# Patient Record
Sex: FEMALE | Race: WHITE | URBAN - METROPOLITAN AREA
[De-identification: names, ages, dates, MRNs, and addresses within clinical notes are randomized per-mention and may not be internally consistent; named-entity substitution may affect disease eponyms.]

---

## 2019-09-17 ENCOUNTER — ANESTHESIA EVENT (OUTPATIENT)
Dept: SURGERY | Facility: CLINIC | Age: 64
End: 2019-09-17
Payer: MEDICARE

## 2019-09-17 NOTE — ANESTHESIA PREPROCEDURE EVALUATION
Anesthesia Pre-Procedure Evaluation    Patient: Alana Brown   MRN: [id number] : 1955          Preoperative Diagnosis: Right shoulder rotator cuff tear    Procedure(s):  ARTHROSCOPY, SHOULDER WITH REPAIR, ROTATOR CUFF,MINI OPEN    History reviewed. No pertinent past medical history.  Past Surgical History:   Procedure Laterality Date     COSMETIC SURGERY      breast augmention     ORTHOPEDIC SURGERY      back surgery, 4th and 5th lumbar       Anesthesia Evaluation     . Pt has had prior anesthetic. Type: General and MAC    No history of anesthetic complications          ROS/MED HX    ENT/Pulmonary:     (+)allergic rhinitis, , . .    Neurologic:  - neg neurologic ROS     Cardiovascular:     (+) ----. : . . . :. . Previous cardiac testing date:results:date: results:ECG reviewed date:19 results:NSR, low voltage QRS date: results:          METS/Exercise Tolerance:  >4 METS   Hematologic:  - neg hematologic  ROS       Musculoskeletal: Comment: Lat. Epicondylitis  CTS  (+)  other musculoskeletal- right rotator cuff tear      GI/Hepatic:  - neg GI/hepatic ROS       Renal/Genitourinary:  - ROS Renal section negative       Endo:  - neg endo ROS       Psychiatric:  - neg psychiatric ROS       Infectious Disease:  - neg infectious disease ROS       Malignancy:      - no malignancy   Other: Comment: HPV  Cervical dysplasia  Leukorrhea                           Physical Exam  Normal systems: cardiovascular, pulmonary and dental    Airway   Mallampati: I  TM distance: >3 FB  Neck ROM: full    Dental     Cardiovascular   Rhythm and rate: regular and normal      Pulmonary    breath sounds clear to auscultation            No results found for: WBC, HGB, HCT, PLT, CRP, SED, NA, POTASSIUM, CHLORIDE, CO2, BUN, CR, GLC, GLENIS, PHOS, MAG, ALBUMIN, PROTTOTAL, ALT, AST, GGT, ALKPHOS, BILITOTAL, BILIDIRECT, LIPASE, AMYLASE, PIPO, PTT, INR, FIBR, TSH, T4, T3, HCG, HCGS, CKTOTAL, CKMB, TROPN    Preop Vitals  BP Readings  from Last 3 Encounters:   07/19/11 118/68    Pulse Readings from Last 3 Encounters:   07/19/11 72      Resp Readings from Last 3 Encounters:   No data found for Resp    SpO2 Readings from Last 3 Encounters:   No data found for SpO2      Temp Readings from Last 1 Encounters:   07/19/11 36.7  C (98  F)    Ht Readings from Last 1 Encounters:   07/19/11 1.524 m (5')      Wt Readings from Last 1 Encounters:   07/19/11 63.8 kg (140 lb 9.6 oz)    Estimated body mass index is 27.46 kg/m  as calculated from the following:    Height as of 7/19/11: 1.524 m (5').    Weight as of 7/19/11: 63.8 kg (140 lb 9.6 oz).       Anesthesia Plan      History & Physical Review  History and physical reviewed and following examination; no interval change.    ASA Status:  2 .    NPO Status:  > 8 hours    Plan for General, ETT and Periph. Nerve Block for postop pain with Propofol and Intravenous induction. Maintenance will be Balanced.      GETA with right interscalene nerve block for post-op pain control.      Postoperative Care  Postoperative pain management:  IV analgesics, Oral pain medications and Peripheral nerve block (Single Shot).      Consents  Anesthetic plan, risks, benefits and alternatives discussed with:  Patient..                 HANY Reyes CRNA

## 2019-09-19 ENCOUNTER — ANESTHESIA (OUTPATIENT)
Dept: SURGERY | Facility: CLINIC | Age: 64
End: 2019-09-19
Payer: MEDICARE

## 2019-09-19 ENCOUNTER — HOSPITAL ENCOUNTER (OUTPATIENT)
Facility: CLINIC | Age: 64
Discharge: HOME | End: 2019-09-19
Attending: ORTHOPAEDIC SURGERY | Admitting: ORTHOPAEDIC SURGERY
Payer: MEDICARE

## 2019-09-19 VITALS
HEIGHT: 64 IN | RESPIRATION RATE: 16 BRPM | DIASTOLIC BLOOD PRESSURE: 99 MMHG | SYSTOLIC BLOOD PRESSURE: 151 MMHG | WEIGHT: 140 LBS | HEART RATE: 79 BPM | BODY MASS INDEX: 23.9 KG/M2 | OXYGEN SATURATION: 96 % | TEMPERATURE: 97.6 F

## 2019-09-19 DIAGNOSIS — M75.101 TEAR OF RIGHT ROTATOR CUFF, UNSPECIFIED TEAR EXTENT, UNSPECIFIED WHETHER TRAUMATIC: Primary | ICD-10-CM

## 2019-09-19 PROCEDURE — 25000125 ZZHC RX 250: Performed by: NURSE ANESTHETIST, CERTIFIED REGISTERED

## 2019-09-19 PROCEDURE — 25800030 ZZH RX IP 258 OP 636: Performed by: NURSE ANESTHETIST, CERTIFIED REGISTERED

## 2019-09-19 PROCEDURE — C1713 ANCHOR/SCREW BN/BN,TIS/BN: HCPCS | Performed by: ORTHOPAEDIC SURGERY

## 2019-09-19 PROCEDURE — 25000566 ZZH SEVOFLURANE, EA 15 MIN: Performed by: ORTHOPAEDIC SURGERY

## 2019-09-19 PROCEDURE — C9290 INJ, BUPIVACAINE LIPOSOME: HCPCS | Performed by: ORTHOPAEDIC SURGERY

## 2019-09-19 PROCEDURE — 25000128 H RX IP 250 OP 636: Performed by: NURSE ANESTHETIST, CERTIFIED REGISTERED

## 2019-09-19 PROCEDURE — 25000128 H RX IP 250 OP 636: Performed by: ORTHOPAEDIC SURGERY

## 2019-09-19 PROCEDURE — 37000009 ZZH ANESTHESIA TECHNICAL FEE, EACH ADDTL 15 MIN: Performed by: ORTHOPAEDIC SURGERY

## 2019-09-19 PROCEDURE — 27110028 ZZH OR GENERAL SUPPLY NON-STERILE: Performed by: ORTHOPAEDIC SURGERY

## 2019-09-19 PROCEDURE — 25000128 H RX IP 250 OP 636: Performed by: PHYSICIAN ASSISTANT

## 2019-09-19 PROCEDURE — 27210794 ZZH OR GENERAL SUPPLY STERILE: Performed by: ORTHOPAEDIC SURGERY

## 2019-09-19 PROCEDURE — 25000132 ZZH RX MED GY IP 250 OP 250 PS 637: Mod: GY | Performed by: NURSE ANESTHETIST, CERTIFIED REGISTERED

## 2019-09-19 PROCEDURE — 71000027 ZZH RECOVERY PHASE 2 EACH 15 MINS: Performed by: ORTHOPAEDIC SURGERY

## 2019-09-19 PROCEDURE — 36000063 ZZH SURGERY LEVEL 4 EA 15 ADDTL MIN: Performed by: ORTHOPAEDIC SURGERY

## 2019-09-19 PROCEDURE — 36000093 ZZH SURGERY LEVEL 4 1ST 30 MIN: Performed by: ORTHOPAEDIC SURGERY

## 2019-09-19 PROCEDURE — 40000306 ZZH STATISTIC PRE PROC ASSESS II: Performed by: ORTHOPAEDIC SURGERY

## 2019-09-19 PROCEDURE — 37000008 ZZH ANESTHESIA TECHNICAL FEE, 1ST 30 MIN: Performed by: ORTHOPAEDIC SURGERY

## 2019-09-19 PROCEDURE — 71000012 ZZH RECOVERY PHASE 1 LEVEL 1 FIRST HR: Performed by: ORTHOPAEDIC SURGERY

## 2019-09-19 DEVICE — IMPLANTABLE DEVICE: Type: IMPLANTABLE DEVICE | Site: SHOULDER | Status: FUNCTIONAL

## 2019-09-19 DEVICE — IMP ANCHOR SUTURE HEALICOIL REGENESORB 4.75MM BLUE 72203705: Type: IMPLANTABLE DEVICE | Site: SHOULDER | Status: FUNCTIONAL

## 2019-09-19 RX ORDER — DEXAMETHASONE SODIUM PHOSPHATE 4 MG/ML
4 INJECTION, SOLUTION INTRA-ARTICULAR; INTRALESIONAL; INTRAMUSCULAR; INTRAVENOUS; SOFT TISSUE EVERY 10 MIN PRN
Status: DISCONTINUED | OUTPATIENT
Start: 2019-09-19 | End: 2019-09-19 | Stop reason: HOSPADM

## 2019-09-19 RX ORDER — ACETAMINOPHEN 325 MG/1
650 TABLET ORAL EVERY 4 HOURS PRN
Qty: 50 TABLET | Refills: 0
Start: 2019-09-19

## 2019-09-19 RX ORDER — HYDROXYZINE HYDROCHLORIDE 25 MG/1
25 TABLET, FILM COATED ORAL EVERY 6 HOURS PRN
Status: DISCONTINUED | OUTPATIENT
Start: 2019-09-19 | End: 2019-09-19 | Stop reason: HOSPADM

## 2019-09-19 RX ORDER — SODIUM CHLORIDE, SODIUM LACTATE, POTASSIUM CHLORIDE, CALCIUM CHLORIDE 600; 310; 30; 20 MG/100ML; MG/100ML; MG/100ML; MG/100ML
INJECTION, SOLUTION INTRAVENOUS CONTINUOUS
Status: DISCONTINUED | OUTPATIENT
Start: 2019-09-19 | End: 2019-09-19 | Stop reason: HOSPADM

## 2019-09-19 RX ORDER — ONDANSETRON 2 MG/ML
4 INJECTION INTRAMUSCULAR; INTRAVENOUS EVERY 30 MIN PRN
Status: DISCONTINUED | OUTPATIENT
Start: 2019-09-19 | End: 2019-09-19 | Stop reason: HOSPADM

## 2019-09-19 RX ORDER — ACETAMINOPHEN 325 MG/1
975 TABLET ORAL ONCE
Status: COMPLETED | OUTPATIENT
Start: 2019-09-19 | End: 2019-09-19

## 2019-09-19 RX ORDER — ALBUTEROL SULFATE 0.83 MG/ML
2.5 SOLUTION RESPIRATORY (INHALATION) EVERY 4 HOURS PRN
Status: DISCONTINUED | OUTPATIENT
Start: 2019-09-19 | End: 2019-09-19 | Stop reason: HOSPADM

## 2019-09-19 RX ORDER — MEPERIDINE HYDROCHLORIDE 25 MG/ML
12.5 INJECTION INTRAMUSCULAR; INTRAVENOUS; SUBCUTANEOUS
Status: DISCONTINUED | OUTPATIENT
Start: 2019-09-19 | End: 2019-09-19 | Stop reason: HOSPADM

## 2019-09-19 RX ORDER — ONDANSETRON 4 MG/1
4 TABLET, ORALLY DISINTEGRATING ORAL EVERY 30 MIN PRN
Status: DISCONTINUED | OUTPATIENT
Start: 2019-09-19 | End: 2019-09-19 | Stop reason: HOSPADM

## 2019-09-19 RX ORDER — OXYCODONE HYDROCHLORIDE 5 MG/1
5 TABLET ORAL EVERY 6 HOURS PRN
Qty: 50 TABLET | Refills: 0 | Status: SHIPPED | OUTPATIENT
Start: 2019-09-19 | End: 2019-09-22

## 2019-09-19 RX ORDER — GLYCOPYRROLATE 0.2 MG/ML
INJECTION, SOLUTION INTRAMUSCULAR; INTRAVENOUS PRN
Status: DISCONTINUED | OUTPATIENT
Start: 2019-09-19 | End: 2019-09-19

## 2019-09-19 RX ORDER — CEFAZOLIN SODIUM 1 G/50ML
1 INJECTION, SOLUTION INTRAVENOUS SEE ADMIN INSTRUCTIONS
Status: DISCONTINUED | OUTPATIENT
Start: 2019-09-19 | End: 2019-09-19 | Stop reason: HOSPADM

## 2019-09-19 RX ORDER — GABAPENTIN 300 MG/1
300 CAPSULE ORAL ONCE
Status: COMPLETED | OUTPATIENT
Start: 2019-09-19 | End: 2019-09-19

## 2019-09-19 RX ORDER — CEFAZOLIN SODIUM 2 G/100ML
2 INJECTION, SOLUTION INTRAVENOUS
Status: COMPLETED | OUTPATIENT
Start: 2019-09-19 | End: 2019-09-19

## 2019-09-19 RX ORDER — ONDANSETRON 2 MG/ML
INJECTION INTRAMUSCULAR; INTRAVENOUS PRN
Status: DISCONTINUED | OUTPATIENT
Start: 2019-09-19 | End: 2019-09-19

## 2019-09-19 RX ORDER — FENTANYL CITRATE 50 UG/ML
25-50 INJECTION, SOLUTION INTRAMUSCULAR; INTRAVENOUS
Status: DISCONTINUED | OUTPATIENT
Start: 2019-09-19 | End: 2019-09-19 | Stop reason: HOSPADM

## 2019-09-19 RX ORDER — NEOSTIGMINE METHYLSULFATE 1 MG/ML
VIAL (ML) INJECTION PRN
Status: DISCONTINUED | OUTPATIENT
Start: 2019-09-19 | End: 2019-09-19

## 2019-09-19 RX ORDER — BUPIVACAINE HYDROCHLORIDE 5 MG/ML
INJECTION, SOLUTION EPIDURAL; INTRACAUDAL PRN
Status: DISCONTINUED | OUTPATIENT
Start: 2019-09-19 | End: 2019-09-19

## 2019-09-19 RX ORDER — PROPOFOL 10 MG/ML
INJECTION, EMULSION INTRAVENOUS PRN
Status: DISCONTINUED | OUTPATIENT
Start: 2019-09-19 | End: 2019-09-19

## 2019-09-19 RX ORDER — HYDROMORPHONE HYDROCHLORIDE 1 MG/ML
.3-.5 INJECTION, SOLUTION INTRAMUSCULAR; INTRAVENOUS; SUBCUTANEOUS EVERY 10 MIN PRN
Status: DISCONTINUED | OUTPATIENT
Start: 2019-09-19 | End: 2019-09-19 | Stop reason: HOSPADM

## 2019-09-19 RX ORDER — HYDROXYZINE HYDROCHLORIDE 50 MG/1
50 TABLET, FILM COATED ORAL EVERY 6 HOURS PRN
Status: DISCONTINUED | OUTPATIENT
Start: 2019-09-19 | End: 2019-09-19 | Stop reason: HOSPADM

## 2019-09-19 RX ORDER — NALOXONE HYDROCHLORIDE 0.4 MG/ML
.1-.4 INJECTION, SOLUTION INTRAMUSCULAR; INTRAVENOUS; SUBCUTANEOUS
Status: DISCONTINUED | OUTPATIENT
Start: 2019-09-19 | End: 2019-09-19 | Stop reason: HOSPADM

## 2019-09-19 RX ORDER — LIDOCAINE HYDROCHLORIDE 10 MG/ML
INJECTION, SOLUTION EPIDURAL; INFILTRATION; INTRACAUDAL; PERINEURAL PRN
Status: DISCONTINUED | OUTPATIENT
Start: 2019-09-19 | End: 2019-09-19

## 2019-09-19 RX ORDER — LIDOCAINE 40 MG/G
CREAM TOPICAL
Status: DISCONTINUED | OUTPATIENT
Start: 2019-09-19 | End: 2019-09-19 | Stop reason: HOSPADM

## 2019-09-19 RX ORDER — HYDROXYZINE PAMOATE 25 MG/1
25-50 CAPSULE ORAL 4 TIMES DAILY PRN
Qty: 50 CAPSULE | Refills: 0 | Status: ON HOLD | OUTPATIENT
Start: 2019-09-19 | End: 2020-08-31

## 2019-09-19 RX ORDER — DEXAMETHASONE SODIUM PHOSPHATE 4 MG/ML
INJECTION, SOLUTION INTRA-ARTICULAR; INTRALESIONAL; INTRAMUSCULAR; INTRAVENOUS; SOFT TISSUE PRN
Status: DISCONTINUED | OUTPATIENT
Start: 2019-09-19 | End: 2019-09-19

## 2019-09-19 RX ORDER — EPHEDRINE SULFATE 50 MG/ML
INJECTION, SOLUTION INTRAMUSCULAR; INTRAVENOUS; SUBCUTANEOUS PRN
Status: DISCONTINUED | OUTPATIENT
Start: 2019-09-19 | End: 2019-09-19

## 2019-09-19 RX ORDER — FENTANYL CITRATE 50 UG/ML
INJECTION, SOLUTION INTRAMUSCULAR; INTRAVENOUS PRN
Status: DISCONTINUED | OUTPATIENT
Start: 2019-09-19 | End: 2019-09-19

## 2019-09-19 RX ORDER — LIDOCAINE HYDROCHLORIDE 10 MG/ML
INJECTION, SOLUTION INFILTRATION; PERINEURAL PRN
Status: DISCONTINUED | OUTPATIENT
Start: 2019-09-19 | End: 2019-09-19

## 2019-09-19 RX ORDER — METOPROLOL TARTRATE 1 MG/ML
1-2 INJECTION, SOLUTION INTRAVENOUS EVERY 5 MIN PRN
Status: DISCONTINUED | OUTPATIENT
Start: 2019-09-19 | End: 2019-09-19 | Stop reason: HOSPADM

## 2019-09-19 RX ADMIN — Medication 10 MG: at 14:15

## 2019-09-19 RX ADMIN — GLYCOPYRROLATE 0.6 MG: 0.2 INJECTION, SOLUTION INTRAMUSCULAR; INTRAVENOUS at 15:23

## 2019-09-19 RX ADMIN — MIDAZOLAM 2 MG: 1 INJECTION INTRAMUSCULAR; INTRAVENOUS at 13:07

## 2019-09-19 RX ADMIN — FENTANYL CITRATE 50 MCG: 50 INJECTION, SOLUTION INTRAMUSCULAR; INTRAVENOUS at 15:30

## 2019-09-19 RX ADMIN — DEXAMETHASONE SODIUM PHOSPHATE 4 MG: 4 INJECTION, SOLUTION INTRA-ARTICULAR; INTRALESIONAL; INTRAMUSCULAR; INTRAVENOUS; SOFT TISSUE at 14:20

## 2019-09-19 RX ADMIN — SODIUM CHLORIDE, POTASSIUM CHLORIDE, SODIUM LACTATE AND CALCIUM CHLORIDE: 600; 310; 30; 20 INJECTION, SOLUTION INTRAVENOUS at 14:43

## 2019-09-19 RX ADMIN — BUPIVACAINE 10 ML: 13.3 INJECTION, SUSPENSION, LIPOSOMAL INFILTRATION at 13:22

## 2019-09-19 RX ADMIN — Medication 5 MG: at 14:39

## 2019-09-19 RX ADMIN — ROCURONIUM BROMIDE 40 MG: 10 INJECTION INTRAVENOUS at 14:05

## 2019-09-19 RX ADMIN — ONDANSETRON 4 MG: 2 INJECTION INTRAMUSCULAR; INTRAVENOUS at 15:22

## 2019-09-19 RX ADMIN — SODIUM CHLORIDE, POTASSIUM CHLORIDE, SODIUM LACTATE AND CALCIUM CHLORIDE: 600; 310; 30; 20 INJECTION, SOLUTION INTRAVENOUS at 12:34

## 2019-09-19 RX ADMIN — CEFAZOLIN SODIUM 2 G: 2 INJECTION, SOLUTION INTRAVENOUS at 13:58

## 2019-09-19 RX ADMIN — GLYCOPYRROLATE 0.2 MG: 0.2 INJECTION, SOLUTION INTRAMUSCULAR; INTRAVENOUS at 14:05

## 2019-09-19 RX ADMIN — Medication 3 MG: at 15:23

## 2019-09-19 RX ADMIN — ACETAMINOPHEN 975 MG: 325 TABLET, FILM COATED ORAL at 12:48

## 2019-09-19 RX ADMIN — BUPIVACAINE HYDROCHLORIDE 10 ML: 5 INJECTION, SOLUTION EPIDURAL; INTRACAUDAL; PERINEURAL at 13:22

## 2019-09-19 RX ADMIN — LIDOCAINE HYDROCHLORIDE 50 MG: 10 INJECTION, SOLUTION INFILTRATION; PERINEURAL at 14:05

## 2019-09-19 RX ADMIN — PROPOFOL 150 MG: 10 INJECTION, EMULSION INTRAVENOUS at 14:05

## 2019-09-19 RX ADMIN — MIDAZOLAM 2 MG: 1 INJECTION INTRAMUSCULAR; INTRAVENOUS at 13:13

## 2019-09-19 RX ADMIN — FENTANYL CITRATE 200 MCG: 50 INJECTION, SOLUTION INTRAMUSCULAR; INTRAVENOUS at 14:05

## 2019-09-19 RX ADMIN — LIDOCAINE HYDROCHLORIDE 3 ML: 10 INJECTION, SOLUTION EPIDURAL; INFILTRATION; INTRACAUDAL; PERINEURAL at 13:19

## 2019-09-19 RX ADMIN — GABAPENTIN 300 MG: 300 CAPSULE ORAL at 12:49

## 2019-09-19 ASSESSMENT — MIFFLIN-ST. JEOR: SCORE: 1165.04

## 2019-09-19 NOTE — DISCHARGE INSTRUCTIONS
Same Day Surgery Discharge Instructions  Special Precautions After Surgery - Adult    1. It is not unusual to feel lightheaded or faint, up to 24 hours after surgery or while taking pain medication.  If you have these symptoms; sit for a few minutes before standing and have someone assist you when getting up.  2. You should rest and relax for the next 24 hours and must have someone stay with you for at least 24 hours after your discharge.  3. DO NOT DRIVE any vehicle or operate mechanical equipment for 24 hours following the end of your surgery.  DO NOT DRIVE while taking narcotic pain medications that have been prescribed by your physician.  If you had a limb operated on, you must be able to use it fully to drive.  4. DO NOT drink alcoholic beverages for 24 hours following surgery or while taking prescription pain medication.  5. Drink clear liquids (apple juice, ginger ale, broth, 7-Up, etc.).  Progress to your regular diet as you feel able.  6. Any questions call your physician and do not make important decisions for 24 hours.    ACTIVITY  ? Restrictions: Keep sling on at all times except when bathing and dressing. Use ice for the next several days.     INCISIONAL CARE  ? May remove dressing in 48 hours.  ? Keep incision dry for 48 hours.  ? Keep extremity elevated above the level of the heart if possible.  Check color and feeling of right fingers.  ? Apply ice 1/2 hour on and 1/2 hour off for first 72 hours.  ? Be alert for signs of infection:  redness, swelling, heat, drainage of pus, and/or elevated temperature.  Contact your doctor if these occur.        Call for an appointment to return to the clinic  2 weeks as scheduled.    Medications:  ? Hydrocodone (Norco, Vicodin):  Next dose: start tonight and stay on schedule.  ? Hydroxyzine as needed for pain,itching,nausea.  Next dose: ***  ? Tylenol as needed for pain.  ? Follow the instructions on the bottle.     Additional discharge  instructions: MD,Block,Expiral intsructions.  __________________________________________________________________________________________________________________________________  IMPORTANT NUMBERS:    [facility] Main Number:  [phone], [phone]  Pharmacy:  [phone]  Same Day Surgery:  [phone], Monday - Friday until 8:30 p.m.  Urgent Care:  [phone]  Emergency Room:  [phone]      [facility]:  [phone]                                                                             [facility]:  [phone] option [address] [place] [address] Orthopedics:  [phone]     [facility] Clinic:  [phone]   Surgery Specialty Clinic:  [phone]   Home Medical Equipment: [phone]  [facility]:  [phone]

## 2019-09-19 NOTE — ANESTHESIA CARE TRANSFER NOTE
Patient: Alana Brown    Procedure(s):  ARTHROSCOPY, SHOULDER WITH REPAIR, ROTATOR CUFF,MINI OPEN    Diagnosis: Right shoulder rotator cuff tear  Diagnosis Additional Information: No value filed.    Anesthesia Type:   General, ETT, Periph. Nerve Block for postop pain     Note:  Airway :Nasal Cannula  Patient transferred to:PACU  Handoff Report: Identifed the Patient, Identified the Reponsible Provider, Reviewed the pertinent medical history, Discussed the surgical course, Reviewed Intra-OP anesthesia mangement and issues during anesthesia, Set expectations for post-procedure period and Allowed opportunity for questions and acknowledgement of understanding      Vitals: (Last set prior to Anesthesia Care Transfer)    CRNA VITALS  9/19/2019 1515 - 9/19/2019 1553      9/19/2019             Resp Rate (observed):  4  (Abnormal)                 Electronically Signed By: HANY Reyes CRNA  September 19, 2019  3:53 PM

## 2019-09-19 NOTE — ANESTHESIA PROCEDURE NOTES
Peripheral nerve/Neuraxial procedure note : interscalene  Pre-Procedure  Performed by  Henrry Back, in the presence of a teaching physician  Clinton Agudelo APRN CRNA  Location: pre-op      Pre-Anesthestic Checklist: patient identified, IV checked, site marked, risks and benefits discussed, informed consent, monitors and equipment checked, pre-op evaluation, at physician/surgeon's request and post-op pain management    Timeout  Correct Patient: Yes   Correct Procedure: Yes   Correct Site: Yes   Correct Laterality: Yes   Correct Position: Yes   Site Marked: Yes   .   Procedure Documentation    .    Procedure:  right  Interscalene.  Local skin infiltrated with mL of 1% lidocaine.     Ultrasound used to identify targeted nerve, plexus, or vascular marker and placed a needle adjacent to it., Ultrasound was used to visualize the spread of the anesthetic in close proximity to the above stated nerve. A permanent image is entered into the patient's record.  Patient Prep;mask, sterile gloves, chlorhexidine gluconate and isopropyl alcohol, patient draped.  Nerve Stim: Initial Level 1 mA.  Lowest motor response 0.4 mA..  Needle: insulated, short bevel Needle Gauge: 20.    Needle Length (Inches) 2  Insertion Method: Single Shot.       Assessment/Narrative  Paresthesias: No.  Injection made incrementally with aspirations every 5 mL..  The placement was negative for: blood aspirated, painful injection and site bleeding.  Bolus given via needle. No blood aspirated via catheter.   Secured via.   Complications: none. Test dose of mL at. Test dose negative for signs of intravascular, subdural or intrathecal injection. Comments:  10mL Exparel with 10mL 0.5% PF marcaine admixture  UDV via ultrasound.  Performed by Henrry Back SRNA supervised by Clinton Agudelo CRNA

## 2019-09-19 NOTE — PROCEDURES
9/19/2019     PREOPERATIVE DIAGNOSIS:    1. Right shoulder rotator cuff tear      POSTOPERATIVE DIAGNOSIS:    1. Right shoulder rotator cuff tear      PROCEDURE:    1. Right shoulder arthroscopy with mini open rotator cuff repair    SURGEON: Morgan Velez MD      ASSISTANT: Jasmine Lopez PA-c.  The presence of a PA was necessary for positioning retraction, and safe progression of the case      ANESTHESIA:  General with interscalene block      BLOOD LOSS: 50cc      COMPLICATIONS: None apparent      DISPOSITION: Stable to PACU      IMPLANTS: [employer] 4.75mm Regenesorb Healicoil x 2 and 5.5mm PEEK MultiFix x 2      INDICATIONS: Alana is a 63 year old female with a history of right shoulder pain due to a large cuff tear.  Worked up revealed a large full thickness cuff tear.  She failed non op management and after discussing treatment options, she elected to proceed with a right shoulder rotator cuff repair to minimize pain and optimize function, understanding the risks of stiffness, ongoing pain, retear, infection, damage to vessels or nerves, and risks inherent to anesthesia.      PROCEDURE: Alana was met in the preoperative holding area where the right shoulder was marked.  She had an interscalene block placed by anesthesia which she tolerated well.  She was then transferred to the operating theater.  After smooth induction of general anesthesia, she was positioned in a beach chair position with all bony prominences padded and head in a neutral position.  A timeout was performed verifying the correct patient, surgery, and location.  She received preoperative antibiotics.  The right shoulder was then prepped and draped in a standard sterile fashion.      We then made a standard posterior lateral portal spot soft spot followed by an anterior portal through the rotator interval under spinal needle visualization. Diagnostic arthroscopy was carried out which showed moderate synovitis throughout the glenohumeral  joint.  The upper rolled border of the subscapularis was intact. There was a large 3cm crescent shaped tear of the supraspinatus and infraspinatus.  The biceps tendon was retracted out of the joint.  There was mild degenerative fraying of the anterior superior labrum.  There was no significant glenohumeral arthritis.  There were no loose bodies. Following the diagnostic arthroscopy, we used a combination of a shaver and ablator to debride the degenerative labral tissue back to a smooth, stable, balanced rim.       We then turned our attention to the subacromial space. We entered this posterolaterally. We then made a lateral portal under spinal needle visualization. A soft tissue subacromial bursectomy was carried out, taking care to leave the CA ligament intact.  From the bursal side, again there was a large crescent shaped tear of the rotator cuff.  A traction suture was placed in the tear.  We then proceeded with the mini open rotator cuff repair. We extended the lateral portal up to the acromion. Dissection was sharply down through skin and subcutaneous tissue and the deltoid muscle split in line with its fibers. We then placed a self-retaining retractor and exposed the rotator cuff tear. The tuberosity with debrided back to a bed of healthy bleeding bone with a rongeur.  We then placed two medial row 4.75mm Regenesorb Healicoil anchors, one anteriorly and one posteriorly.  The tails of the tape and suture were passed through the cuff then tied, securing the medial row.  One tape tail from the anteromedial anchor and one tape tail from the posteromedial anchor were brought to an anterolateral 5.5mm PEEK MultiFix.  The remaining tails were brought to posterolateral 4.5mm PEEK Footprint.  The anchors were implanted, sutures, tensioned, and locking mechanism in anchor deployed.  The shoulder was brought through range of motion and the rotator cuff noted to be moving concentrically with humeral head. All wounds were  thoroughly irrigated.  Deltoid fascia was closed with 0 Vicryl, subcutaneous layer with 3-0 Monocryl, and skin with Monocryl. Sterile compressive dressing was applied followed by a sling, she was awoken anesthesia and transferred to PACU in stable condition.      POSTOPERATIVE PLAN:    1. Charge home today when meets same day discharge criteria   2. Keep sling on at all times. May remove for bathing and pendulums.   3. Return to clinic in 2 weeks for wound check. We'll start physical therapy at 6 weeks postoperatively.      JANNETH MAGDALENO MD

## 2019-09-19 NOTE — ANESTHESIA POSTPROCEDURE EVALUATION
Patient: Alana Brown    Procedure(s):  ARTHROSCOPY, SHOULDER WITH REPAIR, ROTATOR CUFF,MINI OPEN    Diagnosis:Right shoulder rotator cuff tear  Diagnosis Additional Information: No value filed.    Anesthesia Type:  General, ETT, Periph. Nerve Block for postop pain    Note:  Anesthesia Post Evaluation    Patient location during evaluation: Phase 2 and Bedside  Patient participation: Able to participate in evaluation but full recovery from regional anesthesia has not yet ocurrred but is anticipated to occur within 48 hours  Level of consciousness: awake and alert  Pain management: adequate  Airway patency: patent  Cardiovascular status: acceptable  Respiratory status: acceptable  Hydration status: acceptable  PONV: none     Anesthetic complications: None          Last vitals:  Vitals:    09/19/19 1630 09/19/19 1637 09/19/19 1645   BP: (!) 140/88  (!) 143/93   Pulse: 79     Resp: 9  12   Temp:  35.9  C (96.7  F)    SpO2: 96%  94%         Electronically Signed By: Clinton Agudelo CRNA, APRN CRNA  September 19, 2019  4:57 PM

## 2020-06-04 ENCOUNTER — DOCUMENTATION ONLY (OUTPATIENT)
Dept: CARE COORDINATION | Facility: CLINIC | Age: 65
End: 2020-06-04

## 2020-06-11 NOTE — TELEPHONE ENCOUNTER
DIAGNOSIS: new varicose vein consult//text invite   DATE RECEIVED: 6.22.20   NOTES STATUS DETAILS   OFFICE NOTE from referring provider N/A    OFFICE NOTE from other specialist N/A    OPERATIVE REPORT N/A    MEDICATION LIST Internal    PERTINENT LABS N/A    CTA (CT ANGIOGRAPHY) N/A    CT N/A    MRI N/A    ULTRASOUND N/A

## 2020-06-15 ENCOUNTER — TELEPHONE (OUTPATIENT)
Dept: VASCULAR SURGERY | Facility: CLINIC | Age: 65
End: 2020-06-15

## 2020-06-15 DIAGNOSIS — I83.891 SYMPTOMATIC VARICOSE VEINS OF RIGHT LOWER EXTREMITY: Primary | ICD-10-CM

## 2020-06-15 NOTE — TELEPHONE ENCOUNTER
I called and left a voicemail including my call back number.  I called to find out more about her possible varicose vein leg issues and get her scheduled for possible imaging.  SIMON Santoyo RN, BSN  Interventional Radiology Nurse Coordinator   Phone:  [phone]  Pt returned my call.  She splits her time between [place] and [place].  She is leaving soon for [place].  She has right leg only varicose vein issues.  She feels aching and tingling and notes discoloration in her right ankle.  Pt saw a physician while in [place] over a year ago and was prescribed knee high compression stockings.  She wears on and off since then.  She has had no vein surgeries, and no hx of DVT or superficial vein clot.  PLan is to move her consult and get RLE venous comp ultrasound prior.  SIMON Santoyo RN, BSN  Interventional Radiology Nurse Coordinator   Phone:  [phone]

## 2020-06-22 ENCOUNTER — PRE VISIT (OUTPATIENT)
Dept: VASCULAR SURGERY | Facility: CLINIC | Age: 65
End: 2020-06-22

## 2020-07-13 ENCOUNTER — OFFICE VISIT (OUTPATIENT)
Dept: VASCULAR SURGERY | Facility: CLINIC | Age: 65
End: 2020-07-13
Payer: MEDICARE

## 2020-07-13 ENCOUNTER — ANCILLARY PROCEDURE (OUTPATIENT)
Dept: ULTRASOUND IMAGING | Facility: CLINIC | Age: 65
End: 2020-07-13
Attending: RADIOLOGY
Payer: MEDICARE

## 2020-07-13 VITALS — SYSTOLIC BLOOD PRESSURE: 161 MMHG | DIASTOLIC BLOOD PRESSURE: 91 MMHG | HEART RATE: 55 BPM

## 2020-07-13 DIAGNOSIS — I83.891 SYMPTOMATIC VARICOSE VEINS OF RIGHT LOWER EXTREMITY: ICD-10-CM

## 2020-07-13 DIAGNOSIS — I83.891 SYMPTOMATIC VARICOSE VEINS OF RIGHT LOWER EXTREMITY: Primary | ICD-10-CM

## 2020-07-13 ASSESSMENT — PAIN SCALES - GENERAL: PAINLEVEL: MODERATE PAIN (5)

## 2020-07-13 NOTE — LETTER
7/13/2020       RE: Alana Brown  [address]  [facility] [address]     Dear Colleague,    Thank you for referring your patient, Alana Brown, to the [facility] at [facility]. Please see a copy of my visit note below.          Interventional Radiology Clinic Visit    Date of this visit: 7/13/2020    Alana Brown  is referred for treatment recommendations. The patient requires evaluation for diagnosis of varicose veins    [facility]        History Of Present Illness:    Alana Brown is a 64 year old female who presents with significant RLE venous insufficiency. She was initially seen by a physician in [place] for this in April 2019. He prescribed medical grade compression stockings at that time, and she has been wearing them regularly since. She moved however, and did not get follow up care with him. She states that for years she has had issues including leg swelling, pain, and bulging veins on the right. This is disruptive of her lifestyle. It requires elevation for alleviation . It also subsides in the morning and worsens as the goes on. She feels calf pressure like pain that can be 5-7/10 in severity. She does not routinely take analgesics for this pain. She worked as a  for many years, which involved standing for long hours. She states that the bulging mid calf veins are the most tender and problematic for her.    Review of Systems:    Negative except as noted    Past Medical/Surgical History:    No past medical history on file.  Past Surgical History:   Procedure Laterality Date     ARTHROSCOPY SHOULDER, OPEN ROTATOR CUFF REPAIR, COMBINED Right 9/19/2019    Procedure: ARTHROSCOPY, SHOULDER WITH REPAIR, ROTATOR CUFF,MINI OPEN;  Surgeon: Morgan Velez MD;  Location: [facility] OR     COSMETIC SURGERY  2004    breast augmention     ORTHOPEDIC SURGERY  2007    back surgery, 4th and 5th lumbar       Current  Medications:    Current Outpatient Medications   Medication Sig Dispense Refill     progesterone (PROMETRIUM) 100 MG capsule Take 100 mg by mouth daily       acetaminophen (TYLENOL) 325 MG tablet Take 2 tablets (650 mg) by mouth every 4 hours as needed for mild pain (Patient not taking: Reported on 7/13/2020) 50 tablet 0     hydrOXYzine (VISTARIL) 25 MG capsule Take 1-2 capsules (25-50 mg) by mouth 4 times daily as needed for itching (Take wtih pain meds.  Helps with nausea, muscle spasms, and pain) (Patient not taking: Reported on 7/13/2020) 50 capsule 0       Allergies:    Nka [no known allergies]    Family History:    No family history on file.    Social History:      Social History     Socioeconomic History     Marital status:      Spouse name: None     Number of children: None     Years of education: None     Highest education level: None   Occupational History     None   Social Needs     Financial resource strain: None     Food insecurity     Worry: None     Inability: None     Transportation needs     Medical: None     Non-medical: None   Tobacco Use     Smoking status: Never Smoker     Smokeless tobacco: Never Used   Substance and Sexual Activity     Alcohol use: Yes     Comment: glass of wine at night     Drug use: No     Sexual activity: Yes   Lifestyle     Physical activity     Days per week: None     Minutes per session: None     Stress: None   Relationships     Social connections     Talks on phone: None     Gets together: None     Attends Scientologist service: None     Active member of club or organization: None     Attends meetings of clubs or organizations: None     Relationship status: None     Intimate partner violence     Fear of current or ex partner: None     Emotionally abused: None     Physically abused: None     Forced sexual activity: None   Other Topics Concern     Parent/sibling w/ CABG, MI or angioplasty before 65F 55M? No   Social History Narrative     None       Physical Exam:    BP  (!) 161/91 (BP Location: Right arm)   Pulse 55      GENERAL APPEARANCE: healthy, alert and no distress  PSYCHIATRIC: mentation appears normal and affect normal.  EYES: No jaundice.  SKIN: No jaundice.   Extremities: b/l DP/PT pulses +2, bulging veins along the medial right thigh, mid calf, and ankle. No wounds. Scattered telangiectasias in this distribution as well as some subtle hyperpigmentation. Scaling and mild lipodermatosclerosis at the ankle only    Laboratory Studies:    No results found for: HGB  No results found for: PLT  No results found for: WBC    No results found for: INR    No results found for: PROTTOTAL   No results found for: ALBUMIN  No results found for: BILITOTAL  No results found for: BILICONJ   No results found for: ALKPHOS  No results found for: AST  No results found for: ALT    No results found for: CR  No results found for: BUN    No results found for: FETO    Imaging:     I reviewed the venous competency US from today, which shows: right side: dilated incompetent varicose veins at the thigh and ankle, dilated competent veins at the mid calf, and diffuse GSV incompetence, as read by me      ASSESSMENT/PLAN:    RLE truncal varicosities, symptomatic and life style disruptive for the patient that has not responded to trial of compression stockings for more than three months. We will schedule her for R GSV EVLA with sclerotherapy of the ankle varicosities, microphlebectomy of the thigh veins, and possible treatment of the calf veins, depending on insurance approval.    A total of 45 minutes was spent in care for the patient, of which >50% was spent in counseling and co-ordination of care.    It was a pleasure seeing the patient.     Sagar Rojas MD    I, Dr Yeyo Bradley, was present with the fellow during the history and exam. I discussed the case with the fellow and agree with the findings as documented in the assessment and plan.      Yeyo Bradley MD    Vascular and  Interventional Radiolgy  [facility]        CC  Patient Care Team:  [facility] PCP - General  SELF, REFERRED    Again, thank you for allowing me to participate in the care of your patient.      Sincerely,    Yeyo Bradley MD

## 2020-07-13 NOTE — PROGRESS NOTES
Interventional Radiology Clinic Visit    Date of this visit: 7/13/2020    Alana Brown  is referred for treatment recommendations. The patient requires evaluation for diagnosis of varicose veins    Primary Physician: [facility]        History Of Present Illness:    Alana Brown is a 64 year old female who presents with significant RLE venous insufficiency. She was initially seen by a physician in [place] for this in April 2019. He prescribed medical grade compression stockings at that time, and she has been wearing them regularly since. She moved however, and did not get follow up care with him. She states that for years she has had issues including leg swelling, pain, and bulging veins on the right. This is disruptive of her lifestyle. It requires elevation for alleviation . It also subsides in the morning and worsens as the goes on. She feels calf pressure like pain that can be 5-7/10 in severity. She does not routinely take analgesics for this pain. She worked as a  for many years, which involved standing for long hours. She states that the bulging mid calf veins are the most tender and problematic for her.    Review of Systems:    Negative except as noted    Past Medical/Surgical History:    No past medical history on file.  Past Surgical History:   Procedure Laterality Date     ARTHROSCOPY SHOULDER, OPEN ROTATOR CUFF REPAIR, COMBINED Right 9/19/2019    Procedure: ARTHROSCOPY, SHOULDER WITH REPAIR, ROTATOR CUFF,MINI OPEN;  Surgeon: Morgan Velez MD;  Location: [place] OR     COSMETIC SURGERY  2004    breast augmention     ORTHOPEDIC SURGERY  2007    back surgery, 4th and 5th lumbar       Current Medications:    Current Outpatient Medications   Medication Sig Dispense Refill     progesterone (PROMETRIUM) 100 MG capsule Take 100 mg by mouth daily       acetaminophen (TYLENOL) 325 MG tablet Take 2 tablets (650 mg) by mouth every 4 hours as needed for mild pain (Patient not  taking: Reported on 7/13/2020) 50 tablet 0     hydrOXYzine (VISTARIL) 25 MG capsule Take 1-2 capsules (25-50 mg) by mouth 4 times daily as needed for itching (Take wtih pain meds.  Helps with nausea, muscle spasms, and pain) (Patient not taking: Reported on 7/13/2020) 50 capsule 0       Allergies:    Nka [no known allergies]    Family History:    No family history on file.    Social History:      Social History     Socioeconomic History     Marital status:      Spouse name: None     Number of children: None     Years of education: None     Highest education level: None   Occupational History     None   Social Needs     Financial resource strain: None     Food insecurity     Worry: None     Inability: None     Transportation needs     Medical: None     Non-medical: None   Tobacco Use     Smoking status: Never Smoker     Smokeless tobacco: Never Used   Substance and Sexual Activity     Alcohol use: Yes     Comment: glass of wine at night     Drug use: No     Sexual activity: Yes   Lifestyle     Physical activity     Days per week: None     Minutes per session: None     Stress: None   Relationships     Social connections     Talks on phone: None     Gets together: None     Attends [employer] service: None     Active member of club or organization: None     Attends meetings of clubs or organizations: None     Relationship status: None     Intimate partner violence     Fear of current or ex partner: None     Emotionally abused: None     Physically abused: None     Forced sexual activity: None   Other Topics Concern     Parent/sibling w/ CABG, MI or angioplasty before 65F 55M? No   Social History Narrative     None       Physical Exam:    BP (!) 161/91 (BP Location: Right arm)   Pulse 55      GENERAL APPEARANCE: healthy, alert and no distress  PSYCHIATRIC: mentation appears normal and affect normal.  EYES: No jaundice.  SKIN: No jaundice.   Extremities: b/l DP/PT pulses +2, bulging veins along the medial right  thigh, mid calf, and ankle. No wounds. Scattered telangiectasias in this distribution as well as some subtle hyperpigmentation. Scaling and mild lipodermatosclerosis at the ankle only    Laboratory Studies:    No results found for: HGB  No results found for: PLT  No results found for: WBC    No results found for: INR    No results found for: PROTTOTAL   No results found for: ALBUMIN  No results found for: BILITOTAL  No results found for: BILICONJ   No results found for: ALKPHOS  No results found for: AST  No results found for: ALT    No results found for: CR  No results found for: BUN    No results found for: FETO    Imaging:     I reviewed the venous competency US from today, which shows: right side: dilated incompetent varicose veins at the thigh and ankle, dilated competent veins at the mid calf, and diffuse GSV incompetence, as read by me      ASSESSMENT/PLAN:    RLE truncal varicosities, symptomatic and life style disruptive for the patient that has not responded to trial of compression stockings for more than three months. We will schedule her for R GSV EVLA with sclerotherapy of the ankle varicosities, microphlebectomy of the thigh veins, and possible treatment of the calf veins, depending on insurance approval.    A total of 45 minutes was spent in care for the patient, of which >50% was spent in counseling and co-ordination of care.    It was a pleasure seeing the patient.     Sagar Rojas MD    I, Dr Yeyo Bradley, was present with the fellow during the history and exam. I discussed the case with the fellow and agree with the findings as documented in the assessment and plan.      Yeyo Bradley MD    Vascular and Interventional Radiolgy  [facility]        CC  Patient Care Team:  [facility] PCP - General  SELF, REFERRED

## 2020-07-13 NOTE — LETTER
Date:July 30, 2020      Patient was self referred, no letter generated. Do not send.        [facility] Health Information

## 2020-07-13 NOTE — NURSING NOTE
Chief Complaint   Patient presents with     Consult     Alana is here today for a consult for a varicose vein on her right leg.     GHULAM Real

## 2020-07-23 ENCOUNTER — TELEPHONE (OUTPATIENT)
Dept: VASCULAR SURGERY | Facility: CLINIC | Age: 65
End: 2020-07-23

## 2020-07-23 DIAGNOSIS — I83.891 SYMPTOMATIC VARICOSE VEINS OF RIGHT LOWER EXTREMITY: Primary | ICD-10-CM

## 2020-07-23 NOTE — TELEPHONE ENCOUNTER
I called and left a voicemail.  I called to get dates for Alana's right leg varicose vein treatment.  SIMON Santoyo RN, BSN  Interventional Radiology Nurse Coordinator   Phone:  [phone]

## 2020-07-27 DIAGNOSIS — I83.891 SYMPTOMATIC VARICOSE VEINS OF RIGHT LOWER EXTREMITY: Primary | ICD-10-CM

## 2020-07-27 DIAGNOSIS — Z11.59 ENCOUNTER FOR SCREENING FOR OTHER VIRAL DISEASES: Primary | ICD-10-CM

## 2020-07-27 NOTE — TELEPHONE ENCOUNTER
I spoke with Alana, she is all set up for her varicose vein treatment and a letter was sent.  SIMON Santoyo, RN, BSN  Interventional Radiology Nurse Coordinator   Phone:  [phone]

## 2020-08-27 DIAGNOSIS — Z11.59 ENCOUNTER FOR SCREENING FOR OTHER VIRAL DISEASES: ICD-10-CM

## 2020-08-27 PROCEDURE — U0003 INFECTIOUS AGENT DETECTION BY NUCLEIC ACID (DNA OR RNA); SEVERE ACUTE RESPIRATORY SYNDROME CORONAVIRUS 2 (SARS-COV-2) (CORONAVIRUS DISEASE [COVID-19]), AMPLIFIED PROBE TECHNIQUE, MAKING USE OF HIGH THROUGHPUT TECHNOLOGIES AS DESCRIBED BY CMS-2020-01-R: HCPCS | Performed by: RADIOLOGY

## 2020-08-28 LAB
SARS-COV-2 RNA SPEC QL NAA+PROBE: NOT DETECTED
SPECIMEN SOURCE: NORMAL

## 2020-08-31 ENCOUNTER — APPOINTMENT (OUTPATIENT)
Dept: INTERVENTIONAL RADIOLOGY/VASCULAR | Facility: CLINIC | Age: 65
End: 2020-08-31
Attending: RADIOLOGY
Payer: MEDICARE

## 2020-08-31 ENCOUNTER — HOSPITAL ENCOUNTER (OUTPATIENT)
Facility: CLINIC | Age: 65
Discharge: HOME | End: 2020-08-31
Attending: RADIOLOGY | Admitting: RADIOLOGY
Payer: MEDICARE

## 2020-08-31 ENCOUNTER — APPOINTMENT (OUTPATIENT)
Dept: MEDSURG UNIT | Facility: CLINIC | Age: 65
End: 2020-08-31
Attending: RADIOLOGY
Payer: MEDICARE

## 2020-08-31 VITALS
SYSTOLIC BLOOD PRESSURE: 150 MMHG | WEIGHT: 145 LBS | TEMPERATURE: 97.9 F | OXYGEN SATURATION: 100 % | HEART RATE: 77 BPM | RESPIRATION RATE: 16 BRPM | BODY MASS INDEX: 27.38 KG/M2 | HEIGHT: 61 IN | DIASTOLIC BLOOD PRESSURE: 85 MMHG

## 2020-08-31 DIAGNOSIS — I83.891 SYMPTOMATIC VARICOSE VEINS OF RIGHT LOWER EXTREMITY: ICD-10-CM

## 2020-08-31 PROCEDURE — 25000125 ZZHC RX 250: Performed by: STUDENT IN AN ORGANIZED HEALTH CARE EDUCATION/TRAINING PROGRAM

## 2020-08-31 PROCEDURE — 40000166 ZZH STATISTIC PP CARE STAGE 1

## 2020-08-31 PROCEDURE — 25800030 ZZH RX IP 258 OP 636: Performed by: PHYSICIAN ASSISTANT

## 2020-08-31 PROCEDURE — 27210732 ZZH ACCESSORY CR1

## 2020-08-31 PROCEDURE — 27210738 ZZH ACCESSORY CR2

## 2020-08-31 PROCEDURE — 25000125 ZZHC RX 250: Performed by: PHYSICIAN ASSISTANT

## 2020-08-31 PROCEDURE — 27211352 ZZ H KIT CR14

## 2020-08-31 PROCEDURE — 37799 UNLISTED PX VASCULAR SURGERY: CPT

## 2020-08-31 PROCEDURE — 36470 NJX SCLRSNT 1 INCMPTNT VEIN: CPT | Mod: XS

## 2020-08-31 PROCEDURE — 36478 ENDOVENOUS LASER 1ST VEIN: CPT

## 2020-08-31 PROCEDURE — 25000132 ZZH RX MED GY IP 250 OP 250 PS 637: Mod: GY | Performed by: PHYSICIAN ASSISTANT

## 2020-08-31 RX ORDER — DEXTROSE MONOHYDRATE 25 G/50ML
25-50 INJECTION, SOLUTION INTRAVENOUS
Status: DISCONTINUED | OUTPATIENT
Start: 2020-08-31 | End: 2020-08-31 | Stop reason: HOSPADM

## 2020-08-31 RX ORDER — DIAZEPAM 5 MG
15 TABLET ORAL
Status: COMPLETED | OUTPATIENT
Start: 2020-08-31 | End: 2020-08-31

## 2020-08-31 RX ORDER — NICOTINE POLACRILEX 4 MG
15-30 LOZENGE BUCCAL
Status: DISCONTINUED | OUTPATIENT
Start: 2020-08-31 | End: 2020-08-31 | Stop reason: HOSPADM

## 2020-08-31 RX ADMIN — LIDOCAINE HYDROCHLORIDE 15 ML: 10 INJECTION, SOLUTION EPIDURAL; INFILTRATION; INTRACAUDAL; PERINEURAL at 14:47

## 2020-08-31 RX ADMIN — DIAZEPAM 15 MG: 5 TABLET ORAL at 13:13

## 2020-08-31 RX ADMIN — TETRADECYL HYDROGEN SULFATE (ESTER) 20 MG: 10 INJECTION, SOLUTION INTRAVENOUS at 14:47

## 2020-08-31 RX ADMIN — LIDOCAINE HYDROCHLORIDE,EPINEPHRINE BITARTRATE: 10; .01 INJECTION, SOLUTION INFILTRATION; PERINEURAL at 14:44

## 2020-08-31 ASSESSMENT — MIFFLIN-ST. JEOR: SCORE: 1135.1

## 2020-08-31 NOTE — IP AVS SNAPSHOT
MRN:[id number]                      After Visit Summary   8/31/2020    Alana Brown    MRN: [id number]           Visit Information        Department      8/31/2020 10:22 AM [address] [facility]          Review of your medicines      UNREVIEWED medicines. Ask your doctor about these medicines       Dose / Directions   acetaminophen 325 MG tablet  Commonly known as:  TYLENOL  Used for:  Tear of right rotator cuff, unspecified tear extent, unspecified whether traumatic      Dose:  650 mg  Take 2 tablets (650 mg) by mouth every 4 hours as needed for mild pain  Quantity:  50 tablet  Refills:  0     MULTIVITAL PO      Refills:  0     progesterone 100 MG capsule  Commonly known as:  PROMETRIUM      Dose:  100 mg  Take 100 mg by mouth daily  Refills:  0     vitamin B-12 250 MCG tablet  Commonly known as:  CYANOCOBALAMIN      Dose:  250 mcg  Take 250 mcg by mouth daily  Refills:  0     VITAMIN D (CHOLECALCIFEROL) PO      Take by mouth daily  Refills:  0        CONTINUE these medicines which have NOT CHANGED       Dose / Directions   order for DME  Used for:  Symptomatic varicose veins of right lower extremity      Please measure and distribute 1 pair of 20mmHg - 30mmHg thigh high open or closed toe compression stockings. Jobst ultrasheer or equivalent.  Quantity:  1 each  Refills:  4              Protect others around you: Learn how to safely use, store and throw away your medicines at www.disposemymeds.org.       Follow-ups after your visit       Your next 10 appointments already scheduled    Sep 01, 2020  9:00 AM CDT  IR FOLLOW UP VISIT OUTPATIENT with [id number]  [facility] [place], Interventional Radiology ([facility] [place]) [address] [address]  [phone]      Sep 08, 2020  9:00 AM CDT  US LOWER EXTREMITY VENOUS DUPLEX RIGHT with UCUSV1  [facility] US ([facility]) [address]  [facility] [address] [phone]   How do I prepare for my exam? (Food and drink instructions)  No Food and Drink Restrictions.    How do I prepare for my exam? (Other instructions)  You do not need to do anything special to prepare for your exam.    What should I wear: Wear comfortable clothes.    How long does the exam take: Most ultrasounds take 30 to 60 minutes.    What should I bring: Bring a list of your medicines, including vitamins, minerals and over-the-counter drugs. It is safest to leave personal items at home.    Do I need a :  No  is needed.    What do I need to tell my doctor: Tell your doctor about any allergies you may have.    What should I do after the exam: No restrictions, You may resume normal activities.    What is this test: An ultrasound uses sound waves to make pictures of the body. Sound waves do not cause pain. The only discomfort may be the pressure of the wand against your skin or full bladder.    Who should I call with questions: If you have any questions, please call the Imaging Department where you will have your exam. Directions, parking instructions, and other information is available on our website, [email or web address].        Care Instructions       After Care Instructions     Discharge Instructions to print to the AVS      PRIOR TO DISCHARGE   - Please keep compression stocking on day and night for the first 3 days. The compression stocking may be removed after the first 24 hours to shower, but should then be put back on immediately afterwards. After the first 3 days, wear the compression stocking daily for the remainder of the 3 weeks, but may remove at night.    - Activity:  Please remain active following discharge and walk at least 20 minutes every day.    - Pain Medications  Ibuprofen 600 mg oral every 8 hours for mild pain or discomfort  Oxycodone/acetaminophen 5/325 1-2 oral every 6 hours as needed for more severe pain. You will receive a  prescription before discharge.  - No hot tubs or hot baths and no vigorous lower extremity exercise (ie stair stepper, running or biking) for 1 week.   - You will have a follow up ultrasound of the treated leg in one week which will be scheduled before you leave.  - You will have a follow up IR clinic appointment and ultrasound in one month. You will be contacted by the IR clinic RN with this appointment.  [phone].           Further instructions from your care team       [facility]  Interventional Radiology  Discharge Instructions for Sclerotherapy, Micro-Phlebectomy and Endovenous Laser Ablation of Right Leg        AFTER YOU GO HOME:       Drink plenty of fluids.       Resume your regular diet, unless otherwise instructed by your Primary Physician.    IF YOU RECEIVED SEDATION:            DO NOT smoke for at least 24 hours       DO NOT drink alcoholic beverages the day of your procedure       DO NOT drive or operate machinery at home or at work for 24 hours.          DO NOT take a bath or shower for at least 12 hours following your procedure.       DO NOT make any important or legal decisions for 24 hours following your procedure.         No tub baths, hot tubs or swimming for one week.        No vigorous lower extremity exercise (i.e. Stair stepper, running, or biking) for one week.    For follow up appointments you will be called to schedule:  After microphlebectomy you have already been scheduled for microsite check day after your procedure with advanced practice provider in clinic.  There they will unwrap your legs, assess your wounds and place you into compression stockings.  Please bring your stockings to this procedure.  Wear compression stockings for 3 days straight, then wear during day time hours for remainder of 3 weeks.  You may remove briefly to shower and if you have your legs elevated.  If EVLA you will have an ultrasound scheduled for 1 week after procedure and an ultrasound  "followed by clinic appointment with your provider at 1 month post procedure      Procedural Physician: Dr. Bradley  Date:2020  Telephone Number:    [phone] Monday-Friday 8-4:30                                      [phone]    IR clinic RN                                      [phone] After 4:30 PM Monday-Friday, Weekends and Holidays, The hospital  will answer, ask for the Interventional Radiologist on call.                                             Someone is available 24 hours /day                   Additional Information About Your Visit       Share Some [employer] Information    FamilyApp lets you send messages to your doctor, view your test results, renew your prescriptions, schedule appointments and more. To sign up, go to [email or web address] . Click on \"Log in\" on the left side of the screen, which will take you to the Welcome page. Then click on \"Sign up Now\" on the right side of the page.     You will be asked to enter the access code listed below, as well as some personal information. Please follow the directions to create your username and password.     Your access code is: [email or web address]  Expires: 10/24/2020  6:31 AM     Your access code will  in 60 days. If you need help or a new code, please call your   [facility] clinic or [phone].       Care EveryWhere ID    This is your Care EveryWhere ID. This could be used by other organizations to access your [facility] medical records  [id number]       Your Vitals Were  Most recent update: 2020  3:55 PM    Blood Pressure   143/73            Pulse   83          Temperature   97.9  F (36.6  C) (Oral)          Respirations   18          Height   1.549 m (5' 1\")             Weight   65.8 kg (145 lb)    Pulse Oximetry   100%    BMI (Body Mass Index)   27.40 kg/m           Primary Care Provider Fax #    [facility] [phone]      Equal Access to Services    REN CORCORAN AH: Misha jiménez " Chantell, reginoda lugastonadaha, drewta kakamille rodriguez, leighann son cruzjaycee garciamarisela behzad. So [facility] [phone].    ATENCIÓN: Si ralph lazar, tiene a garrido disposición servicios gratuitos de asistencia lingüística. Cortney al [phone].    We comply with applicable federal and state civil rights laws, including the [place] Human Rights Act. We do not discriminate on the basis of race, color, creed, Faith, national origin, marital status, age, disability, sex, sexual orientation, or gender identity.       Thank you!    Thank you for choosing [facility] for your care. Our goal is always to provide you with excellent care. Hearing back from our patients is one way we can continue to improve our services. Please take a few minutes to complete the written survey that you may receive in the mail after you visit with us. Thank you!            Medication List      Medications          Morning Afternoon Evening Bedtime As Needed    order for DME  INSTRUCTIONS:  Please measure and distribute 1 pair of 20mmHg - 30mmHg thigh high open or closed toe compression stockings. Jobst ultrasheer or equivalent.                       ASK your doctor about these medications          Morning Afternoon Evening Bedtime As Needed    acetaminophen 325 MG tablet  Also known as:  TYLENOL  INSTRUCTIONS:  Take 2 tablets (650 mg) by mouth every 4 hours as needed for mild pain                     MULTIVITAL PO                     progesterone 100 MG capsule  Also known as:  PROMETRIUM  INSTRUCTIONS:  Take 100 mg by mouth daily                     vitamin B-12 250 MCG tablet  Also known as:  CYANOCOBALAMIN  INSTRUCTIONS:  Take 250 mcg by mouth daily                     VITAMIN D (CHOLECALCIFEROL) PO  INSTRUCTIONS:  Take by mouth daily

## 2020-08-31 NOTE — DISCHARGE INSTRUCTIONS
[facility]  Interventional Radiology  Discharge Instructions for Sclerotherapy, Micro-Phlebectomy and Endovenous Laser Ablation of Right Leg        AFTER YOU GO HOME:       Drink plenty of fluids.       Resume your regular diet, unless otherwise instructed by your Primary Physician.    IF YOU RECEIVED SEDATION:            DO NOT smoke for at least 24 hours       DO NOT drink alcoholic beverages the day of your procedure       DO NOT drive or operate machinery at home or at work for 24 hours.          DO NOT take a bath or shower for at least 12 hours following your procedure.       DO NOT make any important or legal decisions for 24 hours following your procedure.         No tub baths, hot tubs or swimming for one week.        No vigorous lower extremity exercise (i.e. Stair stepper, running, or biking) for one week.    For follow up appointments you will be called to schedule:  After microphlebectomy you have already been scheduled for microsite check day after your procedure with advanced practice provider in clinic.  There they will unwrap your legs, assess your wounds and place you into compression stockings.  Please bring your stockings to this procedure.  Wear compression stockings for 3 days straight, then wear during day time hours for remainder of 3 weeks.  You may remove briefly to shower and if you have your legs elevated.  If EVLA you will have an ultrasound scheduled for 1 week after procedure and an ultrasound followed by clinic appointment with your provider at 1 month post procedure      Procedural Physician: Dr. Bradley  Date:August 31, 2020  Telephone Number:    [phone] Monday-Friday 8-4:30                                      [phone]     clinic RN                                      [phone] After 4:30 PM Monday-Friday, Weekends and Holidays, The hospital  will answer, ask for the Interventional Radiologist on call.                                              Someone is available 24 hours /day

## 2020-08-31 NOTE — IP AVS SNAPSHOT
[address] [address]                                    After Visit Summary   8/31/2020    Alana Brown    MRN: [id number]           After Visit Summary Signature Page    I have received my discharge instructions, and my questions have been answered. I have discussed any challenges I see with this plan with the nurse or doctor.    ..........................................................................................................................................  Patient/Patient Representative Signature      ..........................................................................................................................................  Patient Representative Print Name and Relationship to Patient    ..................................................               ................................................  Date                                   Time    ..........................................................................................................................................  Reviewed by Signature/Title    ...................................................              ..............................................  Date                                               Time          22EPIC Rev 08/18

## 2020-08-31 NOTE — PROGRESS NOTES
EVLA RT GSV     50.7CM  Total Length=50  B=7956  W=7 etienne  Time=358sec  Tumescence-400mL   RT GSV 15cm below knee      RT lower leg   Mid medial calf 1mL 0.5 percent   Right medial calf     3 below knee 20-25cm  2 above knee 12cm

## 2020-08-31 NOTE — PROGRESS NOTES
1620 Pt tolerated oral intake. Discharge instructions reviewed, copy given to pt. Pt up in room, one puncture site with increased oozing at site, reinforced with multiple 4x4's and coban. Pt discharged home accompanied by friend.

## 2020-08-31 NOTE — PROCEDURES
[facility] [place]    Procedure: IR Procedure Note    Date/Time: 8/31/2020 3:46 PM  Performed by: Sagar Rojas MD  Authorized by: Sagar Rojas MD     UNIVERSAL PROTOCOL   Site Marked: NA  Prior Images Obtained and Reviewed:  Yes  Required items: Required blood products, implants, devices and special equipment available    Patient identity confirmed:  Verbally with patient, arm band, provided demographic data and hospital-assigned identification number  Patient was reevaluated immediately before administering moderate or deep sedation or anesthesia  Confirmation Checklist:  Patient's identity using two indicators, relevant allergies, procedure was appropriate and matched the consent or emergent situation and correct equipment/implants were available  Time out: Immediately prior to the procedure a time out was called    Universal Protocol: the Joint Commission Universal Protocol was followed    Preparation: Patient was prepped and draped in usual sterile fashion           ANESTHESIA    Anesthesia: Local infiltration  Local Anesthetic:  Lidocaine 1% without epinephrine      SEDATION    Patient Sedated: No    See dictated procedure note for full details.  Findings: R GSV EVLA, vv sclero, vv microphleb    Specimens: none    Complications: None    Condition: Stable    Plan: 2a    PROCEDURE   Patient Tolerance:  Patient tolerated the procedure well with no immediate complications    Length of time physician/provider present for 1:1 monitoring during sedation: 0

## 2020-08-31 NOTE — IR NOTE
Interventional Radiology Intra-procedural Nursing Note    Patient Name: Alana Brwon  Medical Record Number: [id number]  Today's Date: August 31, 2020    Procedure: Right lower extremity great saphenous vein ablation, sclerotheraapy, microphlebectomy  Proceduralist: Dr Bradley, Dr Rojas    Sedation Notes: No in procedure sedation - received Valium on 2A     Procedure start time: 1424  Puncture time: 1426  Procedure end time: 1540    Report given to: KATTY Velasquez 2A    Other Notes: Pt arrived to IR room 5 from . Consent reviewed. Pt denies any questions or concerns regarding procedure. Pt positioned supine and monitored per protocol. Pt tolerated procedure without any noted complications. Pt transferred back to .    Cynthia Chery

## 2020-08-31 NOTE — PROGRESS NOTES
1553 Pt arrived on 2a post GSV ablation and microphlebectomy. VSS Ra. Multiple puncture sites on right leg, scant oozing at multiple sites. Compression stocking on. Pt eating and drinking. Friend at bedside.

## 2020-09-01 ENCOUNTER — APPOINTMENT (OUTPATIENT)
Dept: INTERVENTIONAL RADIOLOGY/VASCULAR | Facility: CLINIC | Age: 65
End: 2020-09-01
Attending: RADIOLOGY
Payer: MEDICARE

## 2020-09-01 ENCOUNTER — HOSPITAL ENCOUNTER (OUTPATIENT)
Facility: CLINIC | Age: 65
Discharge: HOME | End: 2020-09-01
Attending: RADIOLOGY | Admitting: RADIOLOGY
Payer: MEDICARE

## 2020-09-01 DIAGNOSIS — I83.891 SYMPTOMATIC VARICOSE VEINS OF RIGHT LOWER EXTREMITY: ICD-10-CM

## 2020-09-01 PROCEDURE — G0463 HOSPITAL OUTPT CLINIC VISIT: HCPCS

## 2020-09-01 NOTE — IR NOTE
Patient Name: Alana Brown  Medical Record Number: [id number]  Today's Date: 9/1/2020    Procedure: leg check post microphlebectomy, sclerotherapy, and laser ablation  Proceduralist: Dr TANIA Rojas, Dr TANIA Disla    Sedation Notes: none     Procedure start time: 0924  Procedure end time: 0925  Out of room time: 0930    Report given to: pt upon discharge to home  : none    Other Notes: Pt arrived to IR room 7 from [facility] waiting room. Consent reviewed, pt confirmed. Pt denies any questions or concerns regarding procedure. Pt positioned supine and monitored per protocol. Site cleansed and dressed per protocol. Pt tolerated procedure without any noted complications. Pt transferred back to [facility] room for discharge home.

## 2020-09-02 ENCOUNTER — TELEPHONE (OUTPATIENT)
Dept: VASCULAR SURGERY | Facility: CLINIC | Age: 65
End: 2020-09-02

## 2020-09-02 DIAGNOSIS — Z11.59 ENCOUNTER FOR SCREENING FOR OTHER VIRAL DISEASES: Primary | ICD-10-CM

## 2020-09-02 DIAGNOSIS — I83.891 SYMPTOMATIC VARICOSE VEINS OF RIGHT LOWER EXTREMITY: Primary | ICD-10-CM

## 2020-09-02 NOTE — TELEPHONE ENCOUNTER
I called and spoke with Alaan.  She is doing well post EVLA, Phleb and Sclero RLE.  She denies pain.  She is wearing her compression stocking.  She is scheduled for her 1 week rule out DVT ultrasound next week. She is scheduled for her sclero check and 1 month f/up US on 9/28.  I have mailed a letter with appt details and we discussed need for another Covid test prior to 9/28 date.  All questions answered.  SIMON Santoyo, RN, BSN  Interventional Radiology Nurse Coordinator   Phone:  [phone]

## 2020-09-02 NOTE — LETTER
"September 2, 2020    Alana HOLLIS Eng  [address]  [facility] [address]    Dear Alana,     I have you scheduled as followed for your right leg vein follow up.    Your 1 month post EVLA ultrasound is scheduled Monday September 28th @ 11:30 am at the [facility], [address], [facility]    Your sclerotherapy check is scheduled for Monday September 28th @ 1 pm at the [facility], [address], [facility].  Check into the [facility] waiting room 15 minutes prior.      You may drive yourself to this sclero check, no sedation will be given    It is required that you get  a COVID test no sooner than 4 days prior to your procedure.  Testing here at an ealth facility (there are multiple locations) is resulted within that time frame.  A rapid testing for patients such as yourself to get tested \"day of\" procedure is in the making, but unfortunately it is not up and running yet.  The Covid team member will contact you about 5 to 7 days prior to procedure to help you schedule a test at a facility that is acceptable to you.  Please call the COVID scheduling team at [phone], if you need further assistance in getting testing scheduled or more information on testing sites.          There is a risk of your treatment/procedure being rescheduled if you are Symptomatic, have a positive covid or no covid result.    In other words it is recommended you get tested somewhere that the results are back in time.       Please let me know if you have questions or concerns,    SIMON Santoyo, RN, BSN  Interventional Radiology Nurse Coordinator   Phone:  [phone]    "

## 2020-09-08 ENCOUNTER — ANCILLARY PROCEDURE (OUTPATIENT)
Dept: ULTRASOUND IMAGING | Facility: CLINIC | Age: 65
End: 2020-09-08
Attending: RADIOLOGY
Payer: MEDICARE

## 2020-09-08 DIAGNOSIS — I83.891 SYMPTOMATIC VARICOSE VEINS OF RIGHT LOWER EXTREMITY: ICD-10-CM

## 2020-09-09 ENCOUNTER — TELEPHONE (OUTPATIENT)
Dept: VASCULAR SURGERY | Facility: CLINIC | Age: 65
End: 2020-09-09

## 2020-09-09 NOTE — TELEPHONE ENCOUNTER
I called and left a waylon  [facility] Ultrasound DVT r/out study from yesterday looked good no DVT.  She is scheduled for her next f/up on 9/28.  I left my call back number for questions.  SIMON Santoyo RN, BSN  Interventional Radiology Nurse Coordinator   Phone:  [phone]

## 2020-09-24 DIAGNOSIS — Z11.59 ENCOUNTER FOR SCREENING FOR OTHER VIRAL DISEASES: ICD-10-CM

## 2020-09-24 PROCEDURE — U0003 INFECTIOUS AGENT DETECTION BY NUCLEIC ACID (DNA OR RNA); SEVERE ACUTE RESPIRATORY SYNDROME CORONAVIRUS 2 (SARS-COV-2) (CORONAVIRUS DISEASE [COVID-19]), AMPLIFIED PROBE TECHNIQUE, MAKING USE OF HIGH THROUGHPUT TECHNOLOGIES AS DESCRIBED BY CMS-2020-01-R: HCPCS | Performed by: RADIOLOGY

## 2020-09-25 LAB
SARS-COV-2 RNA SPEC QL NAA+PROBE: NOT DETECTED
SPECIMEN SOURCE: NORMAL

## 2020-09-28 ENCOUNTER — HOSPITAL ENCOUNTER (OUTPATIENT)
Facility: CLINIC | Age: 65
Discharge: HOME | End: 2020-09-28
Attending: RADIOLOGY | Admitting: RADIOLOGY
Payer: MEDICARE

## 2020-09-28 ENCOUNTER — ANCILLARY PROCEDURE (OUTPATIENT)
Dept: ULTRASOUND IMAGING | Facility: CLINIC | Age: 65
End: 2020-09-28
Attending: RADIOLOGY
Payer: MEDICARE

## 2020-09-28 ENCOUNTER — APPOINTMENT (OUTPATIENT)
Dept: INTERVENTIONAL RADIOLOGY/VASCULAR | Facility: CLINIC | Age: 65
End: 2020-09-28
Attending: RADIOLOGY
Payer: MEDICARE

## 2020-09-28 VITALS
OXYGEN SATURATION: 96 % | SYSTOLIC BLOOD PRESSURE: 140 MMHG | HEART RATE: 81 BPM | DIASTOLIC BLOOD PRESSURE: 101 MMHG | RESPIRATION RATE: 20 BRPM

## 2020-09-28 DIAGNOSIS — I83.891 SYMPTOMATIC VARICOSE VEINS OF RIGHT LOWER EXTREMITY: ICD-10-CM

## 2020-09-28 PROCEDURE — G0463 HOSPITAL OUTPT CLINIC VISIT: HCPCS

## 2020-09-28 NOTE — IR NOTE
Patient Name: Alana Brown  Medical Record Number: [id number]  Today's Date: 9/28/2020    Procedure: sclerotherapy check  Proceduralist: Dr MARY Bradley    Sedation Notes: none    Report given to: patient upon discharge   : none    Other Notes: Pt arrived to IR room 2 from [facility] waiting room. Consent reviewed, pt confirmed. Pt denies any questions or concerns regarding procedure. Pt positioned supine and monitored per protocol. Site cleansed and dressed per protocol. Pt tolerated procedure without any noted complications. Pt transferred back to [facility] waiting room for discharge home.

## 2020-09-30 ENCOUNTER — TELEPHONE (OUTPATIENT)
Dept: VASCULAR SURGERY | Facility: CLINIC | Age: 65
End: 2020-09-30

## 2020-09-30 NOTE — TELEPHONE ENCOUNTER
Entered in error ,no further treatment by JAKE Bradley.  SIMON Santoyo, RN, BSN  Interventional Radiology Nurse Coordinator   Phone:  [phone]

## 2024-01-05 ENCOUNTER — APPOINTMENT (RX ONLY)
Dept: URBAN - METROPOLITAN AREA CLINIC 137 | Facility: CLINIC | Age: 69
Setting detail: DERMATOLOGY
End: 2024-01-05

## 2024-01-05 DIAGNOSIS — L81.4 OTHER MELANIN HYPERPIGMENTATION: ICD-10-CM

## 2024-01-05 DIAGNOSIS — L82.1 OTHER SEBORRHEIC KERATOSIS: ICD-10-CM

## 2024-01-05 DIAGNOSIS — L57.8 OTHER SKIN CHANGES DUE TO CHRONIC EXPOSURE TO NONIONIZING RADIATION: ICD-10-CM

## 2024-01-05 DIAGNOSIS — L91.8 OTHER HYPERTROPHIC DISORDERS OF THE SKIN: ICD-10-CM

## 2024-01-05 DIAGNOSIS — D49.2 NEOPLASM OF UNSPECIFIED BEHAVIOR OF BONE, SOFT TISSUE, AND SKIN: ICD-10-CM

## 2024-01-05 DIAGNOSIS — D18.0 HEMANGIOMA: ICD-10-CM

## 2024-01-05 PROBLEM — D18.01 HEMANGIOMA OF SKIN AND SUBCUTANEOUS TISSUE: Status: ACTIVE | Noted: 2024-01-05

## 2024-01-05 PROCEDURE — 11102 TANGNTL BX SKIN SINGLE LES: CPT

## 2024-01-05 PROCEDURE — 99203 OFFICE O/P NEW LOW 30 MIN: CPT | Mod: 25

## 2024-01-05 PROCEDURE — ? COUNSELING

## 2024-01-05 PROCEDURE — ? BIOPSY BY SHAVE METHOD

## 2024-01-05 ASSESSMENT — LOCATION DETAILED DESCRIPTION DERM
LOCATION DETAILED: LEFT PROXIMAL POSTERIOR UPPER ARM
LOCATION DETAILED: LEFT SUPERIOR UPPER BACK
LOCATION DETAILED: RIGHT MID-UPPER BACK
LOCATION DETAILED: RIGHT INFERIOR LATERAL NECK

## 2024-01-05 ASSESSMENT — LOCATION ZONE DERM
LOCATION ZONE: ARM
LOCATION ZONE: TRUNK
LOCATION ZONE: NECK

## 2024-01-05 ASSESSMENT — LOCATION SIMPLE DESCRIPTION DERM
LOCATION SIMPLE: LEFT POSTERIOR UPPER ARM
LOCATION SIMPLE: RIGHT ANTERIOR NECK
LOCATION SIMPLE: LEFT UPPER BACK
LOCATION SIMPLE: RIGHT UPPER BACK

## 2024-01-05 NOTE — PROCEDURE: BIOPSY BY SHAVE METHOD
Detail Level: Detailed
Depth Of Biopsy: dermis
Was A Bandage Applied: Yes
Size Of Lesion In Cm: 0
Biopsy Type: H and E
Biopsy Method: Dermablade
Anesthesia Type: 1% lidocaine with epinephrine
Anesthesia Volume In Cc: 0.5
Hemostasis: Drysol
Wound Care: Petrolatum
Dressing: bandage
Destruction After The Procedure: No
Type Of Destruction Used: Curettage
Curettage Text: The wound bed was treated with curettage after the biopsy was performed.
Cryotherapy Text: The wound bed was treated with cryotherapy after the biopsy was performed.
Electrodesiccation Text: The wound bed was treated with electrodesiccation after the biopsy was performed.
Electrodesiccation And Curettage Text: The wound bed was treated with electrodesiccation and curettage after the biopsy was performed.
Silver Nitrate Text: The wound bed was treated with silver nitrate after the biopsy was performed.
Lab: [id number]
Consent: Written consent was obtained and risks were reviewed including but not limited to scarring, infection, bleeding, scabbing, incomplete removal, nerve damage and allergy to anesthesia.
Post-Care Instructions: I reviewed with the patient in detail post-care instructions. Patient is to keep the biopsy site dry overnight, and then apply bacitracin twice daily until healed. Patient may apply hydrogen peroxide soaks to remove any crusting.
Notification Instructions: Patient will be notified of biopsy results. However, patient instructed to call the office if not contacted within 2 weeks.
Billing Type: Third-Party Bill
Information: Selecting Yes will display possible errors in your note based on the variables you have selected. This validation is only offered as a suggestion for you. PLEASE NOTE THAT THE VALIDATION TEXT WILL BE REMOVED WHEN YOU FINALIZE YOUR NOTE. IF YOU WANT TO FAX A PRELIMINARY NOTE YOU WILL NEED TO TOGGLE THIS TO 'NO' IF YOU DO NOT WANT IT IN YOUR FAXED NOTE.

## 2024-12-26 ENCOUNTER — APPOINTMENT (OUTPATIENT)
Dept: URBAN - METROPOLITAN AREA CLINIC 137 | Facility: CLINIC | Age: 69
Setting detail: DERMATOLOGY
End: 2024-12-26

## 2024-12-26 DIAGNOSIS — L57.8 OTHER SKIN CHANGES DUE TO CHRONIC EXPOSURE TO NONIONIZING RADIATION: ICD-10-CM | Status: UNCHANGED

## 2024-12-26 DIAGNOSIS — L82.1 OTHER SEBORRHEIC KERATOSIS: ICD-10-CM | Status: UNCHANGED

## 2024-12-26 DIAGNOSIS — Z71.89 OTHER SPECIFIED COUNSELING: ICD-10-CM

## 2024-12-26 DIAGNOSIS — L57.0 ACTINIC KERATOSIS: ICD-10-CM

## 2024-12-26 DIAGNOSIS — D18.0 HEMANGIOMA: ICD-10-CM | Status: UNCHANGED

## 2024-12-26 DIAGNOSIS — L82.0 INFLAMED SEBORRHEIC KERATOSIS: ICD-10-CM

## 2024-12-26 PROBLEM — D18.01 HEMANGIOMA OF SKIN AND SUBCUTANEOUS TISSUE: Status: ACTIVE | Noted: 2024-12-26

## 2024-12-26 PROCEDURE — ? POINTED OUT BY PATIENT

## 2024-12-26 PROCEDURE — ? LIQUID NITROGEN

## 2024-12-26 PROCEDURE — ? SUNSCREEN RECOMMENDATIONS

## 2024-12-26 PROCEDURE — 99213 OFFICE O/P EST LOW 20 MIN: CPT | Mod: 25

## 2024-12-26 PROCEDURE — 17110 DESTRUCTION B9 LES UP TO 14: CPT

## 2024-12-26 PROCEDURE — 17000 DESTRUCT PREMALG LESION: CPT | Mod: 59

## 2024-12-26 PROCEDURE — ? COUNSELING

## 2024-12-26 PROCEDURE — 17003 DESTRUCT PREMALG LES 2-14: CPT | Mod: 59

## 2024-12-26 ASSESSMENT — LOCATION ZONE DERM
LOCATION ZONE: ARM
LOCATION ZONE: TRUNK
LOCATION ZONE: AXILLAE
LOCATION ZONE: HAND
LOCATION ZONE: NOSE

## 2024-12-26 ASSESSMENT — LOCATION DETAILED DESCRIPTION DERM
LOCATION DETAILED: LEFT RADIAL DORSAL HAND
LOCATION DETAILED: RIGHT ULNAR DORSAL HAND
LOCATION DETAILED: RIGHT AXILLARY VAULT
LOCATION DETAILED: RIGHT LATERAL UPPER BACK
LOCATION DETAILED: RIGHT RIB CAGE
LOCATION DETAILED: LEFT NASAL DORSUM
LOCATION DETAILED: RIGHT MID-UPPER BACK
LOCATION DETAILED: RIGHT RADIAL DORSAL HAND
LOCATION DETAILED: RIGHT INFRAMAMMARY CREASE (INNER QUADRANT)
LOCATION DETAILED: LEFT NASAL ALA
LOCATION DETAILED: LEFT DISTAL RADIAL DORSAL FOREARM
LOCATION DETAILED: LEFT INFRAMAMMARY CREASE (INNER QUADRANT)

## 2024-12-26 ASSESSMENT — LOCATION SIMPLE DESCRIPTION DERM
LOCATION SIMPLE: LEFT BREAST
LOCATION SIMPLE: RIGHT AXILLARY VAULT
LOCATION SIMPLE: ABDOMEN
LOCATION SIMPLE: LEFT NOSE
LOCATION SIMPLE: RIGHT BREAST
LOCATION SIMPLE: LEFT FOREARM
LOCATION SIMPLE: NOSE
LOCATION SIMPLE: LEFT HAND
LOCATION SIMPLE: RIGHT UPPER BACK
LOCATION SIMPLE: RIGHT HAND

## 2024-12-26 NOTE — PROCEDURE: LIQUID NITROGEN
Medical Necessity Information: It is in your best interest to select a reason for this procedure from the list below. All of these items fulfill various CMS LCD requirements except the new and changing color options.
Spray Paint Technique: No
Medical Necessity Clause: This procedure was medically necessary because the lesions that were treated were:
Show Aperture Variable?: Yes
Number Of Freeze-Thaw Cycles: 2 freeze-thaw cycles
Detail Level: Detailed
Spray Paint Text: The liquid nitrogen was applied to the skin utilizing a spray paint frosting technique.
Duration Of Freeze Thaw-Cycle (Seconds): 2
Post-Care Instructions: I reviewed with the patient in detail post-care instructions. Patient is to wear sunprotection, and avoid picking at any of the treated lesions. Pt may apply Vaseline to crusted or scabbing areas.
Consent: The patient's consent was obtained including but not limited to risks of crusting, scabbing, blistering, scarring, darker or lighter pigmentary change, recurrence, incomplete removal and infection.
Detail Level: Simple

## 2025-02-28 ENCOUNTER — APPOINTMENT (OUTPATIENT)
Dept: URBAN - METROPOLITAN AREA CLINIC 137 | Facility: CLINIC | Age: 70
Setting detail: DERMATOLOGY
End: 2025-02-28

## 2025-02-28 DIAGNOSIS — B00.1 HERPESVIRAL VESICULAR DERMATITIS: ICD-10-CM | Status: INADEQUATELY CONTROLLED

## 2025-02-28 PROCEDURE — ? PRESCRIPTION

## 2025-02-28 PROCEDURE — ? COUNSELING

## 2025-02-28 PROCEDURE — ? PHOTO-DOCUMENTATION

## 2025-02-28 PROCEDURE — ? POINTED OUT BY PATIENT

## 2025-02-28 PROCEDURE — ? PRESCRIPTION MEDICATION MANAGEMENT

## 2025-02-28 PROCEDURE — 99213 OFFICE O/P EST LOW 20 MIN: CPT

## 2025-02-28 RX ORDER — VALACYCLOVIR 500 MG/1
TABLET, FILM COATED ORAL
Qty: 6 | Refills: 0 | Status: ERX | COMMUNITY
Start: 2025-02-28

## 2025-02-28 RX ORDER — MUPIROCIN 20 MG/G
OINTMENT TOPICAL
Qty: 22 | Refills: 0 | Status: ERX | COMMUNITY
Start: 2025-02-28

## 2025-02-28 RX ADMIN — MUPIROCIN: 20 OINTMENT TOPICAL at 00:00

## 2025-02-28 RX ADMIN — VALACYCLOVIR: 500 TABLET, FILM COATED ORAL at 00:00

## 2025-02-28 ASSESSMENT — LOCATION DETAILED DESCRIPTION DERM: LOCATION DETAILED: LEFT INFERIOR VERMILION LIP

## 2025-02-28 ASSESSMENT — LOCATION ZONE DERM: LOCATION ZONE: LIP

## 2025-02-28 ASSESSMENT — LOCATION SIMPLE DESCRIPTION DERM: LOCATION SIMPLE: LEFT LIP

## 2025-02-28 NOTE — HPI: EVALUATION OF SKIN LESION(S)
Hpi Title: Evaluation of Skin Lesions
Additional History: Patient was in [place] and had lips tattooed about three weeks ago. Blisters are present on lips. Pt reports drainage at times. Pt has used neosporin and peroxide.

## 2025-02-28 NOTE — PROCEDURE: PRESCRIPTION MEDICATION MANAGEMENT
Initiate Treatment: mupirocin 2 % topical ointment (null)\\nQuantity: 22.0 g  Days Supply: 30\\nSig: Apply thin layer 1 gram daily to lips for two weeks or until healed\\n\\nvalacyclovir 500 mg tablet (null)\\nQuantity: 6.0 Tablet\\nSig: Take 1 tablet by mouth twice daily for three days
Render In Strict Bullet Format?: No
Detail Level: Zone

## (undated) DEVICE — GLOVE PROTEXIS W/NEU-THERA 6.5  2D73TE65

## (undated) DEVICE — GOWN IMPERVIOUS SPECIALTY XLG/XLONG 32474

## (undated) DEVICE — BLADE KNIFE SURG 15 371115

## (undated) DEVICE — SU VICRYL 0 CT-1 36" J946H

## (undated) DEVICE — SU MONOCRYL 2-0 CT-1 36" UND Y945H

## (undated) DEVICE — PREP CHLORAPREP 26ML TINTED ORANGE  260815

## (undated) DEVICE — DRSG ADAPTIC 3X8" X3

## (undated) DEVICE — SOL WATER IRRIG 1000ML BOTTLE 07139-09

## (undated) DEVICE — Device

## (undated) DEVICE — GLOVE PROTEXIS W/NEU-THERA 8.0  2D73TE80

## (undated) DEVICE — SU ETHIBOND 0 CT-2 30" X412H

## (undated) DEVICE — BLADE SHAVER ARTHRO 4.2MM FULL RADIUS 9247A

## (undated) DEVICE — DRAPE ARTHROSCOPY SHOULDER BEACHCHAIR 29369

## (undated) DEVICE — ESU ARTHROWAND 90DEG RF AMBIENT SUPER TURBOVAC ASHA4250-01

## (undated) DEVICE — STOCKING SLEEVE COMPRESSION CALF MED

## (undated) DEVICE — IMM KIT SHOULDER TMAX MASK FACE 7210559

## (undated) DEVICE — SUCTION TIP POOLE K770

## (undated) DEVICE — TUBING PUMP LINVATEC 10K150

## (undated) DEVICE — SLING ARM LG 79-99157

## (undated) DEVICE — SOL NACL 0.9% IRRIG 1000ML BOTTLE 07138-09

## (undated) DEVICE — PAD FLOOR SURGISAFE

## (undated) DEVICE — PACK SHOULDER

## (undated) DEVICE — GLOVE PROTEXIS BLUE W/NEU-THERA 6.5  2D73EB65

## (undated) DEVICE — GOWN XLG DISP 9545

## (undated) DEVICE — DRSG STERI STRIP 1/2X4" R1547

## (undated) DEVICE — SU MONOCRYL 3-0 PS-2 18" UND Y497G

## (undated) DEVICE — TAPE MEDIPORE 4"X10YD 2964

## (undated) RX ORDER — PROPOFOL 10 MG/ML
INJECTION, EMULSION INTRAVENOUS
Status: DISPENSED
Start: 2019-09-19

## (undated) RX ORDER — DEXAMETHASONE SODIUM PHOSPHATE 4 MG/ML
INJECTION, SOLUTION INTRA-ARTICULAR; INTRALESIONAL; INTRAMUSCULAR; INTRAVENOUS; SOFT TISSUE
Status: DISPENSED
Start: 2019-09-19

## (undated) RX ORDER — ONDANSETRON 2 MG/ML
INJECTION INTRAMUSCULAR; INTRAVENOUS
Status: DISPENSED
Start: 2019-09-19

## (undated) RX ORDER — DIAZEPAM 5 MG
TABLET ORAL
Status: DISPENSED
Start: 2020-08-31

## (undated) RX ORDER — LIDOCAINE HYDROCHLORIDE 10 MG/ML
INJECTION, SOLUTION EPIDURAL; INFILTRATION; INTRACAUDAL; PERINEURAL
Status: DISPENSED
Start: 2020-08-31

## (undated) RX ORDER — ACETAMINOPHEN 325 MG/1
TABLET ORAL
Status: DISPENSED
Start: 2019-09-19

## (undated) RX ORDER — FENTANYL CITRATE 50 UG/ML
INJECTION, SOLUTION INTRAMUSCULAR; INTRAVENOUS
Status: DISPENSED
Start: 2019-09-19

## (undated) RX ORDER — GABAPENTIN 300 MG/1
CAPSULE ORAL
Status: DISPENSED
Start: 2019-09-19

## (undated) RX ORDER — GLYCOPYRROLATE 0.2 MG/ML
INJECTION, SOLUTION INTRAMUSCULAR; INTRAVENOUS
Status: DISPENSED
Start: 2019-09-19

## (undated) RX ORDER — CEFAZOLIN SODIUM 2 G/100ML
INJECTION, SOLUTION INTRAVENOUS
Status: DISPENSED
Start: 2019-09-19

## (undated) RX ORDER — NEOSTIGMINE METHYLSULFATE 1 MG/ML
VIAL (ML) INJECTION
Status: DISPENSED
Start: 2019-09-19

## (undated) RX ORDER — BUPIVACAINE HYDROCHLORIDE 5 MG/ML
INJECTION, SOLUTION PERINEURAL
Status: DISPENSED
Start: 2019-09-19

## (undated) RX ORDER — LIDOCAINE HYDROCHLORIDE 10 MG/ML
INJECTION, SOLUTION EPIDURAL; INFILTRATION; INTRACAUDAL; PERINEURAL
Status: DISPENSED
Start: 2019-09-19